# Patient Record
Sex: FEMALE | Race: WHITE | NOT HISPANIC OR LATINO | Employment: OTHER | URBAN - METROPOLITAN AREA
[De-identification: names, ages, dates, MRNs, and addresses within clinical notes are randomized per-mention and may not be internally consistent; named-entity substitution may affect disease eponyms.]

---

## 2017-04-11 ENCOUNTER — ALLSCRIPTS OFFICE VISIT (OUTPATIENT)
Dept: OTHER | Facility: OTHER | Age: 67
End: 2017-04-11

## 2017-04-11 DIAGNOSIS — M85.80 OTHER SPECIFIED DISORDERS OF BONE DENSITY AND STRUCTURE, UNSPECIFIED SITE: ICD-10-CM

## 2017-04-11 DIAGNOSIS — Z12.31 ENCOUNTER FOR SCREENING MAMMOGRAM FOR MALIGNANT NEOPLASM OF BREAST: ICD-10-CM

## 2017-12-14 ENCOUNTER — HOSPITAL ENCOUNTER (OUTPATIENT)
Dept: MAMMOGRAPHY | Facility: MEDICAL CENTER | Age: 67
Discharge: HOME | End: 2017-12-14
Payer: MEDICARE

## 2017-12-14 ENCOUNTER — HOSPITAL ENCOUNTER (OUTPATIENT)
Dept: BONE DENSITY | Facility: MEDICAL CENTER | Age: 67
Discharge: HOME | End: 2017-12-14
Payer: MEDICARE

## 2017-12-14 ENCOUNTER — GENERIC CONVERSION - ENCOUNTER (OUTPATIENT)
Dept: OTHER | Facility: OTHER | Age: 67
End: 2017-12-14

## 2017-12-14 DIAGNOSIS — M85.80 OSTEOPENIA, UNSPECIFIED LOCATION: ICD-10-CM

## 2017-12-14 DIAGNOSIS — M85.80 OTHER SPECIFIED DISORDERS OF BONE DENSITY AND STRUCTURE: ICD-10-CM

## 2017-12-14 DIAGNOSIS — Z12.31 ENCOUNTER FOR SCREENING MAMMOGRAM FOR MALIGNANT NEOPLASM OF BREAST: ICD-10-CM

## 2017-12-14 PROCEDURE — G0202 SCR MAMMO BI INCL CAD: HCPCS

## 2017-12-14 PROCEDURE — 77080 DXA BONE DENSITY AXIAL: CPT

## 2018-01-13 NOTE — CONSULTS
Chief Complaint  Patient presents today for her annual exam       History of Present Illness  GYN , Adult Female [facility]: The patient is being seen for a gynecology evaluation  General Health:   Lifestyle:  She does not use tobacco  She denies alcohol use  She denies drug use  Reproductive health: the patient is postmenopausal   she reports no menstrual problems  she uses no contraception  she is sexually active  pregnancy history: G 1P 1  Screening: Cervical cancer screening includes a pap smear performed 2014 and human papilloma virus screening performed 2014  Breast cancer screening includes a mammogram performed last year  Colorectal cancer screening includes no previous colonoscopy  Metabolic screening includes DEXA performed within the past five years  Review of Systems    Constitutional: No fever, no chills, feels well, no tiredness, no recent weight gain or loss  ENT: no ear ache, no loss of hearing, no nosebleeds or nasal discharge, no sore throat or hoarseness  Cardiovascular: no complaints of slow or fast heart rate, no chest pain, no palpitations, no leg claudication or lower extremity edema  Respiratory: no complaints of shortness of breath, no wheezing, no dyspnea on exertion, no orthopnea or PND  Breasts: no complaints of breast pain, breast lump or nipple discharge  Gastrointestinal: no complaints of abdominal pain, no constipation, no nausea or diarrhea, no vomiting, no bloody stools  Genitourinary: no complaints of dysuria, no incontinence, no pelvic pain, no dysmenorrhea, no vaginal discharge or abnormal vaginal bleeding  Musculoskeletal: no complaints of arthralgia, no myalgia, no joint swelling or stiffness, no limb pain or swelling  Integumentary: no complaints of skin rash or lesion, no itching or dry skin, no skin wounds  Neurological: no complaints of headache, no confusion, no numbness or tingling, no dizziness or fainting  Active Problems    1  Encounter for routine pelvic examination (V7 31) (Z01 419)   2  Encounter for screening mammogram for malignant neoplasm of breast (V76 12)   (Z12 31)   3  Hemorrhoids (455 6) (K64 9)   4  Osteopenia (733 90) (M85 80)    Surgical History    · History of  Section   · History of Tonsillectomy    Family History    · Family history of Parkinsons (332 0) (Valeria Genera)    · Family history of colon cancer (V16 0) (Z80 0)    Social History    · Never A Smoker    Current Meds   1  Fish Oil 1000 MG Oral Capsule Recorded   2  Latanoprost 0 005 % Ophthalmic Solution; Therapy: [id number] to (Evaluate:[id number]) Recorded   3  PreserVision AREDS Oral Tablet; Take 1 tablet daily Recorded   4  Super B-100 TABS Recorded   5  Vitamin D3 1000 UNIT Oral Tablet; Take 1 tablet daily Recorded    Allergies    1  Allegra CAPS    Vitals   Recorded: 16BYZ5325 49:09PD   Systolic 202   Diastolic 72   Height 5 ft 1 in   Weight 109 lb 8 oz   BMI Calculated 20 69   BSA Calculated 1 46     Physical Exam    Constitutional   General appearance: No acute distress, well appearing and well nourished  Neck   Neck: Normal, supple, trachea midline, no masses  Thyroid: Normal, no thyromegaly  Pulmonary   Respiratory effort: No increased work of breathing or signs of respiratory distress  Auscultation of lungs: Clear to auscultation  Cardiovascular   Auscultation of heart: Normal rate and rhythm, normal S1 and S2, no murmurs  Peripheral vascular exam: Normal pulses Throughout  Genitourinary   External genitalia: Normal and no lesions appreciated  Vagina: Normal, no lesions or dryness appreciated  Urethra: Normal     Urethral meatus: Normal     Bladder: Normal, soft, non-tender and no prolapse or masses appreciated  Cervix: Normal, no palpable masses  Uterus: Normal, non-tender, not enlarged, and no palpable masses  Adnexa/parametria: Normal, non-tender and no fullness or masses appreciated      Anus, perineum, and rectum: Normal sphincter tone, no masses, and no prolapse  Chest   Breasts: Normal and no dimpling or skin changes noted  Abdomen   Abdomen: Normal, non-tender, and no organomegaly noted  Liver and spleen: No hepatomegaly or splenomegaly  Examination for hernias: No hernias appreciated  Stool sample for occult blood: Negative  Lymphatic   Palpation of lymph nodes in neck, axillae, groin and/or other locations: No lymphadenopathy or masses noted  Skin   Skin and subcutaneous tissue: Normal skin turgor and no rashes  Palpation of skin and subcutaneous tissue: Normal     Psychiatric   Orientation to person, place, and time: Normal     Mood and affect: Normal        Assessment    1  Encounter for routine pelvic examination (V72 31) (Z01 419)   2  Hemorrhoids (455 6) (K64 9)   3  Osteopenia (733 90) (M85 80)   4  Menopausal state (627 2) (N95 1)    Plan  Menopausal state    · Follow-up visit in 1 year Evaluation and Treatment  Follow-up  Status: Hold For -  Scheduling  Requested for: [id number]   Ordered; For: Menopausal state; Ordered By: Isaiah Nunez Performed:  Due: [id number]    Discussion/Summary    Next visit will need Rx for DEXA Scan; has 5 grandchildren; daughter's daughter is now 9 y o   has 4 grandchildren; reviewed potential gyn emergencies; Rx given for next screening mammogram; seen with rafaela Carlson  student, [place]        Signatures   Electronically signed by : Tulio Barrientos DO; Feb 16 2016  5:14PM EST                       (Author)

## 2018-01-14 VITALS
HEIGHT: 61 IN | DIASTOLIC BLOOD PRESSURE: 62 MMHG | BODY MASS INDEX: 20.87 KG/M2 | SYSTOLIC BLOOD PRESSURE: 110 MMHG | WEIGHT: 110.56 LBS

## 2018-01-23 NOTE — MISCELLANEOUS
Message  Message Free Text Note Form: discussed rece t DEXA SCan results and pt  willing to use Prolia   sent task to team to precert      Signatures   Electronically signed by : Sergei Wagoner DO; Dec 19 2017  6:51PM EST                       (Author)

## 2018-03-23 ENCOUNTER — OFFICE VISIT (OUTPATIENT)
Dept: ENDOCRINOLOGY | Facility: CLINIC | Age: 68
End: 2018-03-23
Payer: MEDICARE

## 2018-03-23 VITALS
WEIGHT: 118.4 LBS | HEIGHT: 62 IN | HEART RATE: 64 BPM | BODY MASS INDEX: 21.79 KG/M2 | SYSTOLIC BLOOD PRESSURE: 100 MMHG | DIASTOLIC BLOOD PRESSURE: 70 MMHG

## 2018-03-23 DIAGNOSIS — M81.0 OSTEOPOROSIS, UNSPECIFIED OSTEOPOROSIS TYPE, UNSPECIFIED PATHOLOGICAL FRACTURE PRESENCE: Primary | ICD-10-CM

## 2018-03-23 DIAGNOSIS — R79.89 ELEVATED TSH: ICD-10-CM

## 2018-03-23 PROCEDURE — 99204 OFFICE O/P NEW MOD 45 MIN: CPT | Performed by: INTERNAL MEDICINE

## 2018-03-23 NOTE — PATIENT INSTRUCTIONS
Teriparatide (By injection)   Teriparatide (ter-i-PAR-a-tide)  Treats osteoporosis (weak or brittle bones) in men, and in women who have gone through menopause  Brand Name(s): Forteo   There may be other brand names for this medicine  When This Medicine Should Not Be Used: You should not use this medicine if you have had an allergic reaction to teriparatide  How to Use This Medicine:   Injectable  · Your doctor will prescribe your exact dose and tell you how often it should be given  This medicine is given as a shot under your skin  · This medicine should come with a Medication Guide  Ask your pharmacist for a copy if you do not have one  · You may be taught how to give your medicine at home  Make sure you understand all instructions before giving yourself an injection  Do not use more medicine or use it more often than your doctor tells you to  · You will be shown the body areas where this shot can be given  Use a different body area each time you give yourself a shot  Keep track of where you give each shot to make sure you rotate body areas  · This medicine comes in a special pre-filled pen  Be sure you understand how to use the pen to give yourself a shot  · Give yourself the shot in your thigh muscle or in your lower stomach area  Be sure to put the cap back on the pen after giving yourself a shot  Put the pen back in the refrigerator right after you are done giving yourself the shot  · You can use this medicine at any time of the day, however using it at the same time each day may help you remember to take your shot  · When you first start using this medicine, give yourself a shot while you are in an area where it is easy for you to sit or lay down right away if you become dizzy  · Do not use this medicine if it looks cloudy, colored, or if it has specks in it  The medicine inside the pen should be clear and colorless    · Do not use a pen for more than 28 days (4 weeks) after it has been opened, even if there is still some medicine in it  · You should not use this medicine for longer than 2 years  It is only part of a complete plan for treating osteoporosis  Ask your doctor about other things you can do to help yourself  This may include taking vitamin or mineral supplements, getting regular exercise, and not smoking  If a dose is missed:   · Take a dose as soon as you remember  If it is almost time for your next dose, wait until then and take a regular dose  Do not take extra medicine to make up for a missed dose  How to Store and Dispose of This Medicine:   · Store the medicine pen in the refrigerator  Take the pen out of the refrigerator only long enough to give yourself a shot  Then put it back in the refrigerator right away  Do not freeze the pen  Do not use this medicine if it has been frozen  · Ask your pharmacist, doctor, or health caregiver about the best way to dispose of any leftover medicine and the used pen  You will also need to throw away old medicine 28 days after the first time you use the pen, or after the expiration date has passed  · Keep all medicine out of the reach of children  Never share your medicine with anyone  Drugs and Foods to Avoid:   Ask your doctor or pharmacist before using any other medicine, including over-the-counter medicines, vitamins, and herbal products  · Make sure your doctor knows if you are also using digoxin (Lanoxin®)  Warnings While Using This Medicine:   · Make sure your doctor knows if you are pregnant or breastfeeding, or if you have heart disease, liver disease, kidney disease, kidney stones, or if you are on dialysis  · Make sure your doctor knows if you have hypercalcemia (high levels of calcium in your blood), or if you have a disease that may cause you to have hypercalcemia, such as hyperparathyroidism (overactive parathyroid gland)  · When this medicine was tested in rats, some of the rats developed bone cancer   It is not known if the same thing could happen in people  Your risk of bone cancer may be higher if you have Paget's disease or another bone disease, or if you are still growing  Your risk may also be higher if you have ever had bone cancer, or if you have had external beam or implant radiation treatment on your bones  Make sure your doctor knows if you have ever had any of these bone conditions or treatments  · This medicine may make you dizzy or drowsy  Avoid driving, using machines, or doing anything else that could be dangerous if you are not alert  · You are more likely to get dizzy, lightheaded, or have a pounding heartbeat when you first start using this medicine  Sit or lie down if this happens  Call your doctor if these side effects do not go away, or get worse  · Tell any doctor or dentist who treats you that you are using this medicine  This medicine may affect certain medical test results  · Your doctor will do lab tests at regular visits to check on the effects of this medicine  Keep all appointments  Possible Side Effects While Using This Medicine:   Call your doctor right away if you notice any of these side effects:  · Allergic reaction: Itching or hives, swelling in your face or hands, swelling or tingling in your mouth or throat, chest tightness, trouble breathing  · Chest pain, fast heartbeat  · Leg cramps, joint pain, or muscle spasms  · Lightheadedness or fainting  · Nausea, vomiting, or constipation  · Unusual tiredness or weakness  If you notice these less serious side effects, talk with your doctor:   · Diarrhea or upset stomach  · Headache or neck pain  · Redness, pain, swelling, itching, bruising, or bleeding where the shot was given  · Runny or stuffy nose, cough  · Skin rash  · Sweating  · Trouble sleeping  If you notice other side effects that you think are caused by this medicine, tell your doctor  Call your doctor for medical advice about side effects   You may report side effects to FDA at 1-800-FDA-1088  © 2017 [address] [employer] Information is for End User's use only and may not be sold, redistributed or otherwise used for commercial purposes  The above information is an  only  It is not intended as medical advice for individual conditions or treatments  Talk to your doctor, nurse or pharmacist before following any medical regimen to see if it is safe and effective for you  Zoledronic Acid (By injection)   Zoledronic Acid (kth-ps-WTGH-ik AS-id)  Treats high blood calcium levels  Also treats bone damage caused by Paget disease, multiple myeloma, and cancers that spread to the bone  Also treats osteoporosis and reduces the risk of hip fractures in certain patients  Brand Name(s): PremierPro Rx Zoledronic Acid, Reclast, Zoledronic Acid Novaplus, Zometa   There may be other brand names for this medicine  When This Medicine Should Not Be Used: This medicine is not right for everyone  You should not receive it if you had an allergic reaction to zoledronic acid, or if you are pregnant  How to Use This Medicine:   Injectable  · A nurse or other health provider will give you this medicine  · Your doctor will prescribe your dose and schedule  This medicine is given through a needle placed in a vein  · Your doctor may tell you to drink extra liquids before your treatment to prevent kidney problems  · Your doctor may also give you vitamin D and calcium supplements  Tell your doctor if you are not able to take these medicines  · This medicine should come with a Medication Guide  Ask your pharmacist for a copy if you do not have one  · Missed dose: You must use this medicine on a fixed schedule  Call your doctor or pharmacist if you miss a dose  Drugs and Foods to Avoid:   Ask your doctor or pharmacist before using any other medicine, including over-the-counter medicines, vitamins, and herbal products  · Do not use other medicines that also contain zoledronic acid   Do not use zoledronic acid together with another bisphosphonate medicine  · Some foods and medicines can affect how zoledronic acid works  Tell your doctor if you are using digoxin, antibiotics, diuretics (water pills), an NSAID pain or arthritis medicine (such as aspirin, celecoxib, ibuprofen, naproxen), steroid medicines, or cancer medicines  Warnings While Using This Medicine:   · It is not safe to take this medicine during pregnancy  It could harm an unborn baby  Tell your doctor right away if you become pregnant  · Tell your doctor if you are breastfeeding, or if you have kidney disease, anemia, aspirin-sensitive asthma, bleeding problems, cancer, congestive heart failure, low blood calcium levels, stomach absorption problems, mineral imbalance, dental problems or gum disease  Also tell your doctor if you had surgery on your bowel or parathyroid or thyroid gland  · This medicine may cause the following problems:  ¨ Jaw or teeth problems  ¨ Severe bone, joint, or muscle pain  ¨ Increased risk of thigh bone fracture  ¨ Low calcium levels in your blood  · You must have regular dental exams while you are being treated with this medicine  Tell your dentist or oral surgeon that you are using this medicine  · Your doctor will do lab tests at regular visits to check on the effects of this medicine  Keep all appointments    Possible Side Effects While Using This Medicine:   Call your doctor right away if you notice any of these side effects:  · Allergic reaction: Itching or hives, swelling in your face or hands, swelling or tingling in your mouth or throat, chest tightness, trouble breathing  · Chest pain, trouble breathing, fast or uneven heartbeat  · Decrease in how much or how often you urinate, blood in the urine, lower back or side pain, burning or painful urination  · Muscle spasm or twitching, or numbness or tingling in your fingers, feet, or around your mouth  · Pain, swelling, or numbness in the mouth or jaw, loose teeth or other teeth problems  · Severe muscle, bone, or joint pain  · Unusual pain in your thigh, groin, or hip  If you notice these less serious side effects, talk with your doctor:   · Fever, chills, cough, sore throat, and body aches  · Headache  · Mild nausea, constipation, diarrhea, stomach pain or upset  · Redness, pain, or swelling of your skin where the needle is placed  If you notice other side effects that you think are caused by this medicine, tell your doctor  Call your doctor for medical advice about side effects  You may report side effects to FDA at 8-646-FDA-8602  © 2017 [address] [employer] Information is for End User's use only and may not be sold, redistributed or otherwise used for commercial purposes  The above information is an  only  It is not intended as medical advice for individual conditions or treatments  Talk to your doctor, nurse or pharmacist before following any medical regimen to see if it is safe and effective for you  Denosumab (By injection)   Denosumab (ggj-AKM-bo-mab)  Treats osteoporosis, bone cancer, hypercalcemia, and other bone problems in patients who have cancer  Brand Name(s): Prolia, Xgeva   There may be other brand names for this medicine  When This Medicine Should Not Be Used: This medicine is not right for everyone  You should not receive it if you had an allergic reaction to denosumab or if you are pregnant  How to Use This Medicine:   Injectable  · A doctor or other health professional will give you this medicine  This medicine is usually given as a shot under the skin of your upper arm, upper thigh, or stomach  · This medicine should come with a Medication Guide  Ask your pharmacist for a copy if you do not have one  · Missed dose: Call your doctor or pharmacist for instructions    Drugs and Foods to Avoid:   Ask your doctor or pharmacist before using any other medicine, including over-the-counter medicines, vitamins, and herbal products  · Do not use Prolia® and Xgeva® together  They contain the same medicine  · Some medicines can affect how denosumab works  Tell your doctor if you are also using medicine that weakens your immune system, including a steroid or cancer medicine  Warnings While Using This Medicine:   · This medicine may cause birth defects if either partner is using it during conception or pregnancy  Tell your doctor right away if you or your partner becomes pregnant  Use an effective form of birth control  Women who are being treated with Delaney Hameed should continue using birth control for at least 5 months after the last dose  · Tell your doctor if you are breastfeeding, or if you have kidney disease, diabetes, gum disease, or an allergy to latex  Tell your doctor if you have problems with your thyroid, parathyroid, or digestive system  · This medicine may cause the following problems:  ¨ Low calcium levels in your blood  ¨ Increased risk of broken thigh bone  ¨ Increased risk of infections  ¨ Serious skin reactions  ¨ Severe bone, joint, or muscle pain  · This medicine can cause jaw problems  You must have regular dental exams while you are being treated with this medicine  Tell your dentist that you are using this medicine  Practice good oral hygiene  · Do not suddenly stop using Prolia® without checking first with your doctor  Doing so may increase risk for more fractures  Talk to your doctor about other medicine that you can take  · Your doctor will do lab tests at regular visits to check on the effects of this medicine  Keep all appointments    Possible Side Effects While Using This Medicine:   Call your doctor right away if you notice any of these side effects:  · Allergic reaction: Itching or hives, swelling in your face or hands, swelling or tingling in your mouth or throat, chest tightness, trouble breathing  · Blistering, peeling, red skin rash  · Chest pain, fast or uneven heartbeat, trouble breathing  · Fever, chills, cough, sore throat, body aches  · Lightheadedness, dizziness, fainting  · Muscle spasms or twitching, numbness or tingling in your fingers, toes, or lips  · Pain or burning during urination, change in how much or how often you urinate  · Pain, swelling, heavy feeling, or numbness in your mouth or jaw, loose teeth or other teeth problems  · Severe bone, joint, or muscle pain  · Unusual pain in your thigh, groin, or hip  If you notice these less serious side effects, talk with your doctor:   · Diarrhea, nausea  · Redness, pain, itching, burning, swelling, or a lump under your skin where the shot was given  · Tiredness or weakness  If you notice other side effects that you think are caused by this medicine, tell your doctor  Call your doctor for medical advice about side effects  You may report side effects to FDA at 6-298-FDA-6457  © 2017 [address] [employer] Information is for End User's use only and may not be sold, redistributed or otherwise used for commercial purposes  The above information is an  only  It is not intended as medical advice for individual conditions or treatments  Talk to your doctor, nurse or pharmacist before following any medical regimen to see if it is safe and effective for you  Calcium and Osteoporosis   WHAT YOU NEED TO KNOW:   Calcium is important for osteoporosis because calcium helps build bone mass  Osteoporosis is a long-term medical condition that causes your body to break down more bone than it makes  Your bones become weak, brittle, and more likely to fracture  DISCHARGE INSTRUCTIONS:   Follow up with your healthcare provider or dietitian as directed:  Write down your questions so you remember to ask them during your visits    Your calcium needs:   · Women:      ¨ 19 to 50 years: 1,000 mg    ¨ Over 50: 1,200 mg    ¨ Pregnant or breastfeeding, 19 years to 50 years: 1,000 mg    · Men:      ¨ 19 to 70: 1,000 mg    ¨ Over 70: 1,200 mg  Foods that are high in calcium: The following list shows the number of calcium milligrams (mg) per serving  Your dietitian or healthcare provider can help you create a balanced meal plan for your calcium needs  · Dairy:      ¨ 1 cup of low-fat plain yogurt (415 mg) or low-fat fruit yogurt (245 to 384 mg)    ¨ 1½ ounces of shredded cheddar cheese (306 mg) or part skim mozzarella cheese (275 mg)    ¨ 1 cup of skim, 2%, or whole milk (300 mg)    ¨ 1 cup of cottage cheese made with 2% milk fat (138 mg)    ¨ ½ cup of frozen yogurt (103 mg)    · Other foods:      ¨ 1 cup of calcium-fortified orange juice (300 mg)    ¨ ½ cup of cooked ashanti greens (220 mg)    ¨ 4 canned sardines, with bones (242 mg)    ¨ ½ cup of tofu (with added calcium) (204 mg)  How to get extra calcium:   · Add powdered milk to puddings, cocoa, custard, or hot cereal     · Sift powdered milk into flour when you make cakes, cookies, or breads  · Use low-fat or fat-free milk instead of water in pancake mix, mashed potatoes, pudding, or hot breakfast cereal     · Add low-fat or fat-free cheese to salad, soup, or pasta  · Add tofu (with added calcium) to vegetable stir-salinas  · Take calcium supplements if you cannot get enough calcium from the foods you eat  Your body can absorb the most calcium from supplements when you take 500 mg or less at one time  Do not take more than 2,500 mg of calcium supplements each day  © 2017 [address] [employer] Information is for End User's use only and may not be sold, redistributed or otherwise used for commercial purposes  All illustrations and images included in CareNotes® are the copyrighted property of [employer]  or Jorge Novoa  The above information is an  only  It is not intended as medical advice for individual conditions or treatments  Talk to your doctor, nurse or pharmacist before following any medical regimen to see if it is safe and effective for you

## 2018-03-23 NOTE — PROGRESS NOTES
Bigg Foster 79 y o  female MRN: [id number]    Encounter: [id number]      Assessment/Plan     Problem List Items Addressed This Visit     Osteoporosis - Primary     Will workup for secondary causes of osteoporosis and check vitamin-D 25 hydroxy, thyroid function test, calcium, PTH, SPEP and UPEP  If workup is negative given T-score of -3 she is a candidate for pharmacological therapy  Discussed options of either starting Forteo/bisphosphonate/Prolia  Side effects discussed  Patient handout given  Also counseled on the importance of taking calcium-either dietary or supplementations  Continue vitamin-D supplementations         Relevant Orders    PTH, intact Lab Collect Lab Collect    Vitamin D 25 hydroxy Lab Collect    Protein electrophoresis, urine Lab Collect    Protein electrophoresis, serum Lab Collect    Calcium- Lab Collect    Albumin Lab Collect    Elevated TSH     TSH has been elevated in the past, will repeat thyroid function tests  Relevant Orders    T4, free Lab Collect    TSH, 3rd generation Lab Collect        CC:   Osteoporosis     History of Present Illness     HPI: 80 y/o woman referred here for evaluation of osteoporosis   She had a Recent dexa scan in dec 2017 which showed osteoporosis   Never treated with any pharmacological therapy  Takes vitamin D3 1000 iu daily ,no calcium supplementations , no sig dairy intake   No history of  fragilty farctures   No falls , steady on her feet, does not use any assistive devices  Not on chronic steroids   No anriconvulsensts  Non smoker, occasionbal etoh  Mother and sister both osteoporosis - mother had hip farcture in [de-identified]  Menopause at age 48, not on estrogen         Review of Systems   Constitutional: Negative for fatigue and unexpected weight change  Respiratory: Negative for cough and shortness of breath  Cardiovascular: Negative for palpitations and leg swelling  Gastrointestinal: Positive for constipation   Negative for nausea and vomiting  Endocrine: Negative for polydipsia and polyuria  Musculoskeletal: Positive for arthralgias  Negative for gait problem  Psychiatric/Behavioral: Negative for sleep disturbance  Historical Information   History reviewed  No pertinent past medical history  Past Surgical History:   Procedure Laterality Date     SECTION      TONSILLECTOMY      WISDOM TOOTH EXTRACTION       Social History   History   Alcohol Use    Yes     History   Drug Use No     History   Smoking Status    Never Smoker   Smokeless Tobacco    Never Used     Family History:   Family History   Problem Relation Age of Onset    Osteoporosis Mother     Osteoporosis Sister     Diabetes unspecified Maternal Grandmother        Meds/Allergies   Current Outpatient Prescriptions   Medication Sig Dispense Refill    B Complex-Biotin-FA (SUPER B-100) TABS Take by mouth      Cholecalciferol (VITAMIN D3) 1000 units CAPS Take 1 tablet by mouth daily      latanoprost (XALATAN) 0 005 % ophthalmic solution Apply to eye      Omega-3 Fatty Acids (FISH OIL) 1,000 mg Take by mouth      Timolol Maleate 0 5 % (DAILY) SOLN Apply to eye      TURMERIC PO Take by mouth       No current facility-administered medications for this visit  Allergies   Allergen Reactions    Fexofenadine     Travatan Z [Travoprost]        Objective   Vitals: Blood pressure 100/70, pulse 64, height 5' 1 7" (1 567 m), weight 53 7 kg (118 lb 6 4 oz)  Physical Exam   Constitutional: She is oriented to person, place, and time  She appears well-developed and well-nourished  No distress  HENT:   Head: Normocephalic and atraumatic  Mouth/Throat: No oropharyngeal exudate  Eyes: Conjunctivae and EOM are normal  No scleral icterus  Neck: Normal range of motion  Neck supple  No thyromegaly present  Cardiovascular: Normal rate, regular rhythm and normal heart sounds  No murmur heard    Pulmonary/Chest: Effort normal and breath sounds normal  No respiratory distress  She has no wheezes  She has no rales  Abdominal: Soft  Bowel sounds are normal  She exhibits no distension  There is no tenderness  There is no rebound  Musculoskeletal: Normal range of motion  She exhibits no edema or deformity  Lymphadenopathy:     She has no cervical adenopathy  Neurological: She is alert and oriented to person, place, and time  Skin: Skin is warm and dry  No rash noted  No erythema  No pallor  Psychiatric: She has a normal mood and affect  Her behavior is normal  Judgment and thought content normal        The history was obtained from the review of the chart, patient and family  Lab Results:      April 2314-[id number] metabolic panel essentially within normal range  TSH 2 2    March 2014-TSH 5 2      Imaging Studies:            Results for orders placed during the hospital encounter of 12/14/17   DXA bone density spine hip and pelvis    Impression 1  Based on the [facility] classification, this study identifies a diagnosis of osteoporosis most notable at the femoral neck and spine areas and the patient is considered at elevated risk for fracture  2  A daily intake of calcium of at least 1200 mg and vitamin D, 800-1000 IU, as well as weight bearing and muscle strengthening exercise, fall prevention and avoidance of tobacco and excessive alcohol intake as basic preventive measures are recommended  3  Repeat DXA scan on the same equipment in 18-24 months as clinically indicated  The 10 year risk of hip fracture is 7 3%, with the 10 year risk of major osteoporotic fracture being 26%, as calculated by the [facility] fracture risk assessment tool (FRAX)  The current NOF guidelines recommend treating patients with FRAX 10 year risk score   of >3% for hip fracture and >20% for major osteoporotic fracture  Fracture risks significantly exceed treatment thresholds      WHO CLASSIFICATION:  Normal (a T-score of -1 0 or higher)  Low bone mineral density (a T-score of less than -1 0 but higher than -2 5)  Osteoporosis (a T-score of -2 5 or less)  Severe osteoporosis (a T-score of -2 5 or less with a fragility fracture)      Thank you for allowing us the opportunity to participate in your patient care  The expanded DEXA report will no longer be arriving in your mail  If you desire to view the full report please contact [address] [place] [address] or access the PACS system  Workstation performed: [id number]                  I have personally reviewed pertinent reports  Portions of the record may have been created with voice recognition software  Occasional wrong word or "sound a like" substitutions may have occurred due to the inherent limitations of voice recognition software  Read the chart carefully and recognize, using context, where substitutions have occurred

## 2018-03-23 NOTE — ASSESSMENT & PLAN NOTE
Will workup for secondary causes of osteoporosis and check vitamin-D 25 hydroxy, thyroid function test, calcium, PTH, SPEP and UPEP  If workup is negative given T-score of -3 she is a candidate for pharmacological therapy  Discussed options of either starting Forteo/bisphosphonate/Prolia  Side effects discussed  Patient handout given  Also counseled on the importance of taking calcium-either dietary or supplementations    Continue vitamin-D supplementations

## 2018-03-23 NOTE — LETTER
March 23, 2018     Valerie Collins, [address] [place] [address]    Patient: Nigel Hatchet   YOB: 1950   Date of Visit: 3/23/2018       Dear Dr Cuba Magana: Thank you for referring Haroon Everett to me for evaluation  Below are my notes for this consultation  If you have questions, please do not hesitate to call me  I look forward to following your patient along with you  Sincerely,        Carina Brito MD        CC: No Recipients  Carina Brito MD  3/23/2018 12:33 PM  Sign at close encounter   Nigel Hatchet 79 y o  female MRN: [id number]    Encounter: [id number]      Assessment/Plan     Problem List Items Addressed This Visit     Osteoporosis - Primary     Will workup for secondary causes of osteoporosis and check vitamin-D 25 hydroxy, thyroid function test, calcium, PTH, SPEP and UPEP  If workup is negative given T-score of -3 she is a candidate for pharmacological therapy  Discussed options of either starting Forteo/bisphosphonate/Prolia  Side effects discussed  Patient handout given  Also counseled on the importance of taking calcium-either dietary or supplementations  Continue vitamin-D supplementations         Relevant Orders    PTH, intact Lab Collect Lab Collect    Vitamin D 25 hydroxy Lab Collect    Protein electrophoresis, urine Lab Collect    Protein electrophoresis, serum Lab Collect    Calcium- Lab Collect    Albumin Lab Collect    Elevated TSH     TSH has been elevated in the past, will repeat thyroid function tests  Relevant Orders    T4, free Lab Collect    TSH, 3rd generation Lab Collect        CC:   Osteoporosis     History of Present Illness     HPI: 78 y/o woman referred here for evaluation of osteoporosis   She had a Recent dexa scan in dec 2017 which showed osteoporosis   Never treated with any pharmacological therapy  Takes vitamin D3 1000 iu daily ,no calcium supplementations , no sig dairy intake     No history of  fragilty farctures   No falls , steady on her feet, does not use any assistive devices  Not on chronic steroids   No anriconvulsensts  Non smoker, occasionbal etoh  Mother and sister both osteoporosis - mother had hip farcture in [de-identified]  Menopause at age 48, not on estrogen         Review of Systems   Constitutional: Negative for fatigue and unexpected weight change  Respiratory: Negative for cough and shortness of breath  Cardiovascular: Negative for palpitations and leg swelling  Gastrointestinal: Positive for constipation  Negative for nausea and vomiting  Endocrine: Negative for polydipsia and polyuria  Musculoskeletal: Positive for arthralgias  Negative for gait problem  Psychiatric/Behavioral: Negative for sleep disturbance  Historical Information   History reviewed  No pertinent past medical history  Past Surgical History:   Procedure Laterality Date     SECTION      TONSILLECTOMY      WISDOM TOOTH EXTRACTION       Social History   History   Alcohol Use    Yes     History   Drug Use No     History   Smoking Status    Never Smoker   Smokeless Tobacco    Never Used     Family History:   Family History   Problem Relation Age of Onset    Osteoporosis Mother     Osteoporosis Sister     Diabetes unspecified Maternal Grandmother        Meds/Allergies   Current Outpatient Prescriptions   Medication Sig Dispense Refill    B Complex-Biotin-FA (SUPER B-100) TABS Take by mouth      Cholecalciferol (VITAMIN D3) 1000 units CAPS Take 1 tablet by mouth daily      latanoprost (XALATAN) 0 005 % ophthalmic solution Apply to eye      Omega-3 Fatty Acids (FISH OIL) 1,000 mg Take by mouth      Timolol Maleate 0 5 % (DAILY) SOLN Apply to eye      TURMERIC PO Take by mouth       No current facility-administered medications for this visit        Allergies   Allergen Reactions    Fexofenadine     Travatan Z [Travoprost]        Objective   Vitals: Blood pressure 100/70, pulse 64, height 5' 1 7" (1 567 m), weight 53 7 kg (118 lb 6 4 oz)  Physical Exam   Constitutional: She is oriented to person, place, and time  She appears well-developed and well-nourished  No distress  HENT:   Head: Normocephalic and atraumatic  Mouth/Throat: No oropharyngeal exudate  Eyes: Conjunctivae and EOM are normal  No scleral icterus  Neck: Normal range of motion  Neck supple  No thyromegaly present  Cardiovascular: Normal rate, regular rhythm and normal heart sounds  No murmur heard  Pulmonary/Chest: Effort normal and breath sounds normal  No respiratory distress  She has no wheezes  She has no rales  Abdominal: Soft  Bowel sounds are normal  She exhibits no distension  There is no tenderness  There is no rebound  Musculoskeletal: Normal range of motion  She exhibits no edema or deformity  Lymphadenopathy:     She has no cervical adenopathy  Neurological: She is alert and oriented to person, place, and time  Skin: Skin is warm and dry  No rash noted  No erythema  No pallor  Psychiatric: She has a normal mood and affect  Her behavior is normal  Judgment and thought content normal        The history was obtained from the review of the chart, patient and family  Lab Results:      April 3246-LESLI metabolic panel essentially within normal range  TSH 2 2    March 2014-TSH 5 2      Imaging Studies:            Results for orders placed during the hospital encounter of 12/14/17   DXA bone density spine hip and pelvis    Impression 1  Based on the [facility] classification, this study identifies a diagnosis of osteoporosis most notable at the femoral neck and spine areas and the patient is considered at elevated risk for fracture  2  A daily intake of calcium of at least 1200 mg and vitamin D, 800-1000 IU, as well as weight bearing and muscle strengthening exercise, fall prevention and avoidance of tobacco and excessive alcohol intake as basic preventive measures are recommended      3  Repeat DXA scan on the same equipment in 18-24 months as clinically indicated  The 10 year risk of hip fracture is 7 3%, with the 10 year risk of major osteoporotic fracture being 26%, as calculated by the [facility] fracture risk assessment tool (FRAX)  The current NOF guidelines recommend treating patients with FRAX 10 year risk score   of >3% for hip fracture and >20% for major osteoporotic fracture  Fracture risks significantly exceed treatment thresholds  WHO CLASSIFICATION:  Normal (a T-score of -1 0 or higher)  Low bone mineral density (a T-score of less than -1 0 but higher than -2 5)  Osteoporosis (a T-score of -2 5 or less)  Severe osteoporosis (a T-score of -2 5 or less with a fragility fracture)      Thank you for allowing us the opportunity to participate in your patient care  The expanded DEXA report will no longer be arriving in your mail  If you desire to view the full report please contact [address] [place] [address] or access the PACS system  Workstation performed: [id number]                  I have personally reviewed pertinent reports  Portions of the record may have been created with voice recognition software  Occasional wrong word or "sound a like" substitutions may have occurred due to the inherent limitations of voice recognition software  Read the chart carefully and recognize, using context, where substitutions have occurred

## 2018-04-11 NOTE — PROGRESS NOTES
Please call the patient regarding her  result  Labs ok - has she decided amongst the medications that we discussed last visit ?  She can discuss further on upcoming visit if she has questions or concerns

## 2018-04-12 ENCOUNTER — TELEPHONE (OUTPATIENT)
Dept: ENDOCRINOLOGY | Facility: CLINIC | Age: 68
End: 2018-04-12

## 2018-04-12 NOTE — TELEPHONE ENCOUNTER
----- Message from Naima Day MD sent at 4/11/2018  5:36 PM EDT -----  Please call the patient regarding her  result  Labs ok - has she decided amongst the medications that we discussed last visit ?  She can discuss further on upcoming visit if she has questions or concerns

## 2018-04-25 ENCOUNTER — ANNUAL EXAM (OUTPATIENT)
Dept: OBGYN CLINIC | Facility: MEDICAL CENTER | Age: 68
End: 2018-04-25
Payer: MEDICARE

## 2018-04-25 VITALS
DIASTOLIC BLOOD PRESSURE: 60 MMHG | WEIGHT: 117.6 LBS | SYSTOLIC BLOOD PRESSURE: 110 MMHG | HEIGHT: 62 IN | BODY MASS INDEX: 21.64 KG/M2

## 2018-04-25 DIAGNOSIS — Z12.31 ENCOUNTER FOR SCREENING MAMMOGRAM FOR MALIGNANT NEOPLASM OF BREAST: ICD-10-CM

## 2018-04-25 DIAGNOSIS — M81.8 OTHER OSTEOPOROSIS WITHOUT CURRENT PATHOLOGICAL FRACTURE: ICD-10-CM

## 2018-04-25 DIAGNOSIS — Z01.419 ENCNTR FOR GYN EXAM (GENERAL) (ROUTINE) W/O ABN FINDINGS: Primary | ICD-10-CM

## 2018-04-25 PROCEDURE — G0101 CA SCREEN;PELVIC/BREAST EXAM: HCPCS | Performed by: OBSTETRICS & GYNECOLOGY

## 2018-04-25 NOTE — PROGRESS NOTES
ASSESSMENT & PLAN: Gary Tilley was seen today for gynecologic exam     Diagnoses and all orders for this visit:    Encntr for gyn exam (general) (routine) w/o abn findings    Encounter for screening mammogram for malignant neoplasm of breast  -     Mammo screening bilateral w cad; Future    Other osteoporosis without current pathological fracture    Discussion/Summary:Discussed recent DEXA Scan and asked for my opinion for medical therapy; reviewed that she is a non smoker, does not abuse alcohol, not on thyroid medication, eats very healthy, takes vitamin D on a daily basis, and review of last 4 years, this year actually has grown a half inch in height; she is very active and merely advised to have yearly comprehensive health panel and repeat DEXA in 18-24 months; no medication at present considering potential side effects; preventive exam in 2 years, but can see anytime if any gyn  Issue is present; seen with p lesly  Student, Nellie Pallas, [place]  1  Routine well woman exam done today  2   Pap and HPV:  Pap and cotesting was not done today  Current ASCCP Guidelines reviewed  3   Mammogram was ordered  4   Colonoscopy is up to date  5   DEXA is up to date  DEXA was not ordered today  6  The following were reviewed in today's visit: breast self exam and issues related to osteoporosis  7  F/u 2years  CC:  Annual Gynecologic Examination    HPI: Gerald Mendosa is a 76 y o  who presents for annual gynecologic examination  She has the following concerns:  treatment of osteoporosis, see above discussion    Health Maintenance:    Patient describes her health as excellent  Patient does not have weight concerns  She exercises 7 days per week with walking  She does wears her seatbelt routinely  She does perform regular monthly self breast exams  She does feel safe at home  Patients does not follow a  diet  Patient gets 4 servings of dairy or calcium rich foods daily        Last pap: many years ago  Last mammogram: 2017  Last colonoscopy:     Patient Active Problem List   Diagnosis    Osteoporosis    Elevated TSH       History reviewed  No pertinent past medical history  Past Surgical History:   Procedure Laterality Date     SECTION      TONSILLECTOMY      WISDOM TOOTH EXTRACTION         Past OB/Gyn History:    Patient is currently sexually active  monogamous     Family History   Problem Relation Age of Onset    Osteoporosis Mother     Osteoporosis Sister     Diabetes unspecified Maternal Grandmother     Parkinsonism Father     Colon cancer Paternal Uncle        Social History:   Social History     Social History    Marital status: /Civil Union     Spouse name: N/A    Number of children: N/A    Years of education: N/A     Occupational History    Not on file  Social History Main Topics    Smoking status: Never Smoker    Smokeless tobacco: Never Used    Alcohol use Yes    Drug use: No    Sexual activity: Not on file     Other Topics Concern    Not on file     Social History Narrative    No narrative on file     Presently lives with   Patient is currently unemployed retired  Allergies   Allergen Reactions    Fexofenadine     Travatan Z [Travoprost]          Current Outpatient Prescriptions:     B Complex-Biotin-FA (SUPER B-100) TABS, Take by mouth, Disp: , Rfl:     Cholecalciferol (VITAMIN D3) 1000 units CAPS, Take 1 tablet by mouth daily, Disp: , Rfl:     latanoprost (XALATAN) 0 005 % ophthalmic solution, Apply to eye, Disp: , Rfl:     Omega-3 Fatty Acids (FISH OIL) 1,000 mg, Take by mouth, Disp: , Rfl:     Timolol Maleate 0 5 % (DAILY) SOLN, Apply to eye, Disp: , Rfl:     TURMERIC PO, Take by mouth, Disp: , Rfl:     Review of Systems  Constitutional :no fever, feels well, no tiredness, no recent weight gain or loss  ENT: no ear ache, no loss of hearing, no nosebleeds or nasal discharge, no sore throat or hoarseness    Cardiovascular: no complaints of slow or fast heart beat, no chest pain, no palpitations, no leg claudication or lower extremity edema  Respiratory: no complaints of shortness of shortness of breath, no LEE  Breasts:no complaints of breast pain, breast lump, or nipple discharge  Gastrointestinal: no complaints of abdominal pain, constipation, nausea, vomiting, or diarrhea or bloody stools  Genitourinary : no complaints of dysuria, incontinence, pelvic pain, no dysmenorrhea, vaginal discharge or abnormal vaginal bleeding and as noted in HPI  Musculoskeletal: no complaints of arthralgia, no myalgia, no joint swelling or stiffness, no limb pain or swelling  Integumentary: no complaints of skin rash or lesion, itching or dry skin  Neurological: no complaints of headache, no confusion, no numbness or tingling, no dizziness or fainting     Physical Exam:   /60   Ht 5' 1 5" (1 562 m)   Wt 53 3 kg (117 lb 9 6 oz)   LMP 04/25/2003   Breastfeeding? No   BMI 21 86 kg/m²     General:   appears stated age, cooperative, alert normal mood and affect   Psychiatric oriented to person, place and time  Mood and affect normal   Neck: normal, supple,trachea midline, no masses  Thyroid: normal, no thyromegaly   Heart: regular rate and rhythm, S1, S2 normal, no murmur, click, rub or gallop   Lungs: clear to auscultation bilaterally, no increased work of breathing or signs of respiratory distress   Breasts: normal, no dimpling or skin changes noted   Abdomen: soft, non-tender, without masses or organomegaly   Vulva: normal , no lesions   Vagina: normal , no lesions or dryness   Urethra: normal   Urethal meatus normal   Bladder Normal, soft, non-tender and no prolapse or masses appreciated   Cervix: normal, no palpable masses    Uterus: normal , non-tender, not enlarged, no palpable masses   Adnexa: normal, non-tender without fullness or masses; hemoccult neg     Lymphatic Palpation of lymph nodes in neck, axilla, groin and/or other locations: no lymphadenopathy or masses noted   Skin Normal skin turgor and no rashes    Palpation of skin and subcutaneous tissue normal

## 2019-03-07 ENCOUNTER — HOSPITAL ENCOUNTER (OUTPATIENT)
Dept: MAMMOGRAPHY | Facility: MEDICAL CENTER | Age: 69
Discharge: HOME | End: 2019-03-07
Payer: MEDICARE

## 2019-03-07 VITALS — BODY MASS INDEX: 21.53 KG/M2 | HEIGHT: 62 IN | WEIGHT: 117 LBS

## 2019-03-07 DIAGNOSIS — Z12.31 ENCOUNTER FOR SCREENING MAMMOGRAM FOR MALIGNANT NEOPLASM OF BREAST: ICD-10-CM

## 2019-03-07 PROCEDURE — 77067 SCR MAMMO BI INCL CAD: CPT

## 2019-04-26 ENCOUNTER — ANNUAL EXAM (OUTPATIENT)
Dept: OBGYN CLINIC | Facility: MEDICAL CENTER | Age: 69
End: 2019-04-26
Payer: MEDICARE

## 2019-04-26 VITALS
DIASTOLIC BLOOD PRESSURE: 62 MMHG | SYSTOLIC BLOOD PRESSURE: 100 MMHG | BODY MASS INDEX: 20.8 KG/M2 | WEIGHT: 113 LBS | HEIGHT: 62 IN

## 2019-04-26 DIAGNOSIS — Z01.419 ENCNTR FOR GYN EXAM (GENERAL) (ROUTINE) W/O ABN FINDINGS: Primary | ICD-10-CM

## 2019-04-26 DIAGNOSIS — Z78.0 POSTMENOPAUSAL: ICD-10-CM

## 2019-04-26 DIAGNOSIS — Z12.31 ENCOUNTER FOR SCREENING MAMMOGRAM FOR MALIGNANT NEOPLASM OF BREAST: ICD-10-CM

## 2019-04-26 PROCEDURE — G0101 CA SCREEN;PELVIC/BREAST EXAM: HCPCS | Performed by: OBSTETRICS & GYNECOLOGY

## 2020-07-07 ENCOUNTER — HOSPITAL ENCOUNTER (OUTPATIENT)
Dept: BONE DENSITY | Facility: MEDICAL CENTER | Age: 70
Discharge: HOME | End: 2020-07-07
Payer: MEDICARE

## 2020-07-07 ENCOUNTER — HOSPITAL ENCOUNTER (OUTPATIENT)
Dept: MAMMOGRAPHY | Facility: MEDICAL CENTER | Age: 70
Discharge: HOME | End: 2020-07-07
Payer: MEDICARE

## 2020-07-07 VITALS — WEIGHT: 120 LBS | HEIGHT: 62 IN | BODY MASS INDEX: 22.08 KG/M2

## 2020-07-07 DIAGNOSIS — Z78.0 POSTMENOPAUSAL: ICD-10-CM

## 2020-07-07 DIAGNOSIS — Z12.31 ENCOUNTER FOR SCREENING MAMMOGRAM FOR MALIGNANT NEOPLASM OF BREAST: ICD-10-CM

## 2020-07-07 PROCEDURE — 77063 BREAST TOMOSYNTHESIS BI: CPT

## 2020-07-07 PROCEDURE — 77080 DXA BONE DENSITY AXIAL: CPT

## 2020-07-07 PROCEDURE — 77067 SCR MAMMO BI INCL CAD: CPT

## 2020-07-08 ENCOUNTER — TELEPHONE (OUTPATIENT)
Dept: OBGYN CLINIC | Facility: MEDICAL CENTER | Age: 70
End: 2020-07-08

## 2020-07-08 NOTE — TELEPHONE ENCOUNTER
I  Called and spoke with patent about results of recent DEXA Scan; we discussed conservative management and possible use of Prolia  She may want to stay conservative and repeat DEXA Scan in 18-24 months  She is questioning potential side effects of Prolia  If she decides, will call the office to have someone precert the injection

## 2020-07-17 ENCOUNTER — TELEPHONE (OUTPATIENT)
Dept: OBGYN CLINIC | Facility: MEDICAL CENTER | Age: 70
End: 2020-07-17

## 2020-07-17 NOTE — TELEPHONE ENCOUNTER
Called pt to follow up on dexa results to see if she will like to start the treatment with Prolia  Pt does not  She wants to know if there is anything else she can do, other than the Prolia  Thanks

## 2020-07-24 ENCOUNTER — TELEPHONE (OUTPATIENT)
Dept: OBGYN CLINIC | Facility: MEDICAL CENTER | Age: 70
End: 2020-07-24

## 2020-07-24 NOTE — TELEPHONE ENCOUNTER
I returned patient's call about alternatives for osteopenia ; she prefers no Prolia  Discussed use of calcium, weight bearing exercises, vitamin D  Also to keep regular apts  With Paige Wright for blood work and exams